# Patient Record
Sex: FEMALE | Race: WHITE
[De-identification: names, ages, dates, MRNs, and addresses within clinical notes are randomized per-mention and may not be internally consistent; named-entity substitution may affect disease eponyms.]

---

## 2018-01-26 ENCOUNTER — HOSPITAL ENCOUNTER (EMERGENCY)
Dept: HOSPITAL 47 - EC | Age: 33
Discharge: HOME | End: 2018-01-26
Payer: COMMERCIAL

## 2018-01-26 VITALS
HEART RATE: 74 BPM | SYSTOLIC BLOOD PRESSURE: 117 MMHG | RESPIRATION RATE: 18 BRPM | TEMPERATURE: 98.2 F | DIASTOLIC BLOOD PRESSURE: 76 MMHG

## 2018-01-26 DIAGNOSIS — Z87.891: ICD-10-CM

## 2018-01-26 DIAGNOSIS — Z88.5: ICD-10-CM

## 2018-01-26 DIAGNOSIS — Z86.14: ICD-10-CM

## 2018-01-26 DIAGNOSIS — K80.70: Primary | ICD-10-CM

## 2018-01-26 LAB
ALBUMIN SERPL-MCNC: 4.5 G/DL (ref 3.5–5)
ALP SERPL-CCNC: 91 U/L (ref 38–126)
ALT SERPL-CCNC: 31 U/L (ref 9–52)
AMYLASE SERPL-CCNC: 60 U/L (ref 30–110)
ANION GAP SERPL CALC-SCNC: 12 MMOL/L
AST SERPL-CCNC: 23 U/L (ref 14–36)
BASOPHILS # BLD AUTO: 0.1 K/UL (ref 0–0.2)
BASOPHILS NFR BLD AUTO: 1 %
BUN SERPL-SCNC: 11 MG/DL (ref 7–17)
CALCIUM SPEC-MCNC: 9.6 MG/DL (ref 8.4–10.2)
CHLORIDE SERPL-SCNC: 104 MMOL/L (ref 98–107)
CO2 SERPL-SCNC: 24 MMOL/L (ref 22–30)
EOSINOPHIL # BLD AUTO: 0.2 K/UL (ref 0–0.7)
EOSINOPHIL NFR BLD AUTO: 2 %
ERYTHROCYTE [DISTWIDTH] IN BLOOD BY AUTOMATED COUNT: 4.47 M/UL (ref 3.8–5.4)
ERYTHROCYTE [DISTWIDTH] IN BLOOD: 14.1 % (ref 11.5–15.5)
GLUCOSE SERPL-MCNC: 94 MG/DL (ref 74–99)
HCT VFR BLD AUTO: 36.3 % (ref 34–46)
HGB BLD-MCNC: 12.2 GM/DL (ref 11.4–16)
LIPASE SERPL-CCNC: 73 U/L (ref 23–300)
LYMPHOCYTES # SPEC AUTO: 3.1 K/UL (ref 1–4.8)
LYMPHOCYTES NFR SPEC AUTO: 28 %
MCH RBC QN AUTO: 27.3 PG (ref 25–35)
MCHC RBC AUTO-ENTMCNC: 33.6 G/DL (ref 31–37)
MCV RBC AUTO: 81.2 FL (ref 80–100)
MONOCYTES # BLD AUTO: 0.5 K/UL (ref 0–1)
MONOCYTES NFR BLD AUTO: 5 %
NEUTROPHILS # BLD AUTO: 6.8 K/UL (ref 1.3–7.7)
NEUTROPHILS NFR BLD AUTO: 63 %
PH UR: 5.5 [PH] (ref 5–8)
PLATELET # BLD AUTO: 308 K/UL (ref 150–450)
POTASSIUM SERPL-SCNC: 4.2 MMOL/L (ref 3.5–5.1)
PROT SERPL-MCNC: 7.6 G/DL (ref 6.3–8.2)
SODIUM SERPL-SCNC: 140 MMOL/L (ref 137–145)
SP GR UR: 1.01 (ref 1–1.03)
UROBILINOGEN UR QL STRIP: <2 MG/DL (ref ?–2)
WBC # BLD AUTO: 10.8 K/UL (ref 3.8–10.6)

## 2018-01-26 PROCEDURE — 36415 COLL VENOUS BLD VENIPUNCTURE: CPT

## 2018-01-26 PROCEDURE — 82150 ASSAY OF AMYLASE: CPT

## 2018-01-26 PROCEDURE — 80053 COMPREHEN METABOLIC PANEL: CPT

## 2018-01-26 PROCEDURE — 96360 HYDRATION IV INFUSION INIT: CPT

## 2018-01-26 PROCEDURE — 83690 ASSAY OF LIPASE: CPT

## 2018-01-26 PROCEDURE — 85025 COMPLETE CBC W/AUTO DIFF WBC: CPT

## 2018-01-26 PROCEDURE — 81003 URINALYSIS AUTO W/O SCOPE: CPT

## 2018-01-26 PROCEDURE — 81025 URINE PREGNANCY TEST: CPT

## 2018-01-26 PROCEDURE — 76705 ECHO EXAM OF ABDOMEN: CPT

## 2018-01-26 PROCEDURE — 99284 EMERGENCY DEPT VISIT MOD MDM: CPT

## 2018-01-26 NOTE — ED
General Adult HPI





<Jose Mora - Last Filed: 01/26/18 22:24>





- General


Source: patient, RN notes reviewed


Mode of arrival: ambulatory


Limitations: no limitations





<Carli Pacheco - Last Filed: 01/26/18 22:34>





- General


Chief complaint: Abdominal Pain


Stated complaint: Abd pain


Time Seen by Provider: 01/26/18 19:50





- History of Present Illness


Initial comments: 





32-year-old female presents to the emergency department with a chief complaint 

of epigastric abdominal pain radiates to the back.  She states that she's had 

this on and off for the past month or so.  Of flareup minimal go away.  She 

denies any nausea or vomiting with it.  She denies any relation to anything in 

particular with it.  She has not had any high fever chills.  She denies any 

history of abdominal surgeries in the past.  They were concerned due to her 

continued pain and the fact does not seem to be getting better so they thought 

that they should be evaluated. Patient denies any recent fever, chills, 

shortness of breath, chest pain, back pain, numbness or tingling, dysuria or 

hematuria, constipation or diarrhea, headaches or visual changes, or any other 

current symptoms. (Carli Pacheco)





- Related Data


 Home Medications











 Medication  Instructions  Recorded  Confirmed


 


Ibuprofen [Motrin Ib] 400 mg PO Q6H PRN 01/26/18 01/26/18











 Allergies











Allergy/AdvReac Type Severity Reaction Status Date / Time


 


propoxyphene napsylate AdvReac  Nausea & Verified 01/26/18 19:59





[From Darvocet-N]   Vomiting  














Review of Systems


ROS Other: All systems not noted in ROS Statement are negative.





<Jose Mora - Last Filed: 01/26/18 22:24>


ROS Other: All systems not noted in ROS Statement are negative.





<Carli Pacheco - Last Filed: 01/26/18 22:34>


ROS Statement: 


Those systems with pertinent positive or pertinent negative responses have been 

documented in the HPI.








Past Medical History


Past Medical History: No Reported History


History of Any Multi-Drug Resistant Organisms: MRSA


Date of last positivie culture/infection: 2008


MDRO Source:: Groin, L Breast


Past Surgical History: No Surgical Hx Reported


Past Anesthesia/Blood Transfusion Reactions: No Reported Reaction


Past Psychological History: Anxiety


Smoking Status: Former smoker


Past Alcohol Use History: Occasional


Past Drug Use History: Marijuana





- Past Family History


  ** Mother


Family Medical History: No Reported History





<Carli Pacheco - Last Filed: 01/26/18 22:34>





General Exam





<MorganJose - Last Filed: 01/26/18 22:24>


Limitations: no limitations





<Carli Pacheco - Last Filed: 01/26/18 22:34>





- General Exam Comments


Initial Comments: 





General:  The patient is awake and alert, in no distress, and does not appear 

acutely ill. 


Eye:  Pupils are equal, round and reactive to light, extra-ocular movements are 

intact; there is normal conjunctiva bilaterally.  No signs of icterus.  


Ears, nose, mouth and throat:  There are moist mucous membranes.


Neck:  The neck is supple, there is no tenderness.


Cardiovascular:  There is a regular rate and rhythm. No murmur, rub or gallop 

is appreciated.


Respiratory:  Lungs are clear to auscultation, respirations are non-labored, 

breath sounds are equal.  No wheezes, stridor, rales, or rhonchi.


Gastrointestinal:  Soft, non-distended, mild right upper quadrant tenderness of 

the abdomen without masses or organomegaly noted. There is no rebound or 

guarding present.  No CVA tenderness. Bowel sounds are unremarkable.


Back:  There is no tenderness to palpation in the midline. There is no obvious 

deformity. No rashes noted. 


Musculoskeletal:  Normal ROM, no tenderness, There is no pedal edema. There is 

no calf tenderness or swelling. Sensation intact. Pulses equal bilaterally 2+.  


Neurological:  CN II-XII intact, There are no obvious motor or sensory 

deficits. Coordination appears grossly intact. Speech is normal.


Skin:  Skin is warm and dry and no rashes or lesions are noted. 


Psychiatric:  Cooperative, appropriate mood & affect, normal judgment.  


 (Carli Pacheco)


 Vital Signs











  01/26/18





  19:35


 


Temperature 97.5 F L


 


Pulse Rate 85


 


Respiratory 16





Rate 


 


Blood Pressure 118/55


 


O2 Sat by Pulse 100





Oximetry 














Medical Decision Making





- Lab Data


Result diagrams: 


 01/26/18 20:34





 01/26/18 20:34





<Jose Mora - Last Filed: 01/26/18 22:24>





- Lab Data


Result diagrams: 


 01/26/18 20:34





 01/26/18 20:34





- Radiology Data


Radiology results: report reviewed, image reviewed





<Carli Pacheco - Last Filed: 01/26/18 22:34>





- Medical Decision Making





Medical decision making; is a 32-year-old female has been having worsening 

gallbladder issues.  Today she ate a greasy fatty food meal and developed acute 

discomfort.  Her stools lighter in color recently.  The ultrasound shows 

cholelithiasis but no evidence of obstruction at this time.  White count is 10.

  The patient's feeling significantly better at this time.  She'll be referred 

onto her family doctor and general surgeon for evaluation and she'll also be 

given a prescription for a HIDA scan to be done at her convenience early next 

week.  Dr. Mora (Jose Mora)





32-year-old female presents for abdominal pain.  At this time ultrasound is 

been reviewed.  This time we discussed follow-up with Dr. Duncan due to 

biliary colic as well as cholelithiasis.  This time pain has improved.  At this 

time we did discuss return parameters and follow-up.  We discussed what to 

watch for and when to come back to the ER.  The patient is this plan all 

questions have been answered.  Patient will be discharged. (Carli Pacheco)





- Lab Data


 Lab Results











  01/26/18 01/26/18 01/26/18 Range/Units





  20:34 20:34 21:35 


 


WBC   10.8 H   (3.8-10.6)  k/uL


 


RBC   4.47   (3.80-5.40)  m/uL


 


Hgb   12.2   (11.4-16.0)  gm/dL


 


Hct   36.3   (34.0-46.0)  %


 


MCV   81.2   (80.0-100.0)  fL


 


MCH   27.3   (25.0-35.0)  pg


 


MCHC   33.6   (31.0-37.0)  g/dL


 


RDW   14.1   (11.5-15.5)  %


 


Plt Count   308   (150-450)  k/uL


 


Neutrophils %   63   %


 


Lymphocytes %   28   %


 


Monocytes %   5   %


 


Eosinophils %   2   %


 


Basophils %   1   %


 


Neutrophils #   6.8   (1.3-7.7)  k/uL


 


Lymphocytes #   3.1   (1.0-4.8)  k/uL


 


Monocytes #   0.5   (0-1.0)  k/uL


 


Eosinophils #   0.2   (0-0.7)  k/uL


 


Basophils #   0.1   (0-0.2)  k/uL


 


Sodium  140    (137-145)  mmol/L


 


Potassium  4.2    (3.5-5.1)  mmol/L


 


Chloride  104    ()  mmol/L


 


Carbon Dioxide  24    (22-30)  mmol/L


 


Anion Gap  12    mmol/L


 


BUN  11    (7-17)  mg/dL


 


Creatinine  0.62    (0.52-1.04)  mg/dL


 


Est GFR (MDRD) Af Amer  >60    (>60 ml/min/1.73 sqM)  


 


Est GFR (MDRD) Non-Af  >60    (>60 ml/min/1.73 sqM)  


 


Glucose  94    (74-99)  mg/dL


 


Calcium  9.6    (8.4-10.2)  mg/dL


 


Total Bilirubin  0.7    (0.2-1.3)  mg/dL


 


AST  23    (14-36)  U/L


 


ALT  31    (9-52)  U/L


 


Alkaline Phosphatase  91    ()  U/L


 


Total Protein  7.6    (6.3-8.2)  g/dL


 


Albumin  4.5    (3.5-5.0)  g/dL


 


Amylase  60    ()  U/L


 


Lipase  73    ()  U/L


 


Urine Color     


 


Urine Appearance     (Clear)  


 


Urine pH     (5.0-8.0)  


 


Ur Specific Gravity     (1.001-1.035)  


 


Urine Protein     (Negative)  


 


Urine Glucose (UA)     (Negative)  


 


Urine Ketones     (Negative)  


 


Urine Blood     (Negative)  


 


Urine Nitrite     (Negative)  


 


Urine Bilirubin     (Negative)  


 


Urine Urobilinogen     (<2.0)  mg/dL


 


Ur Leukocyte Esterase     (Negative)  


 


Urine HCG, Qual    Not Detected  (Not Detectd)  














  01/26/18 Range/Units





  21:35 


 


WBC   (3.8-10.6)  k/uL


 


RBC   (3.80-5.40)  m/uL


 


Hgb   (11.4-16.0)  gm/dL


 


Hct   (34.0-46.0)  %


 


MCV   (80.0-100.0)  fL


 


MCH   (25.0-35.0)  pg


 


MCHC   (31.0-37.0)  g/dL


 


RDW   (11.5-15.5)  %


 


Plt Count   (150-450)  k/uL


 


Neutrophils %   %


 


Lymphocytes %   %


 


Monocytes %   %


 


Eosinophils %   %


 


Basophils %   %


 


Neutrophils #   (1.3-7.7)  k/uL


 


Lymphocytes #   (1.0-4.8)  k/uL


 


Monocytes #   (0-1.0)  k/uL


 


Eosinophils #   (0-0.7)  k/uL


 


Basophils #   (0-0.2)  k/uL


 


Sodium   (137-145)  mmol/L


 


Potassium   (3.5-5.1)  mmol/L


 


Chloride   ()  mmol/L


 


Carbon Dioxide   (22-30)  mmol/L


 


Anion Gap   mmol/L


 


BUN   (7-17)  mg/dL


 


Creatinine   (0.52-1.04)  mg/dL


 


Est GFR (MDRD) Af Amer   (>60 ml/min/1.73 sqM)  


 


Est GFR (MDRD) Non-Af   (>60 ml/min/1.73 sqM)  


 


Glucose   (74-99)  mg/dL


 


Calcium   (8.4-10.2)  mg/dL


 


Total Bilirubin   (0.2-1.3)  mg/dL


 


AST   (14-36)  U/L


 


ALT   (9-52)  U/L


 


Alkaline Phosphatase   ()  U/L


 


Total Protein   (6.3-8.2)  g/dL


 


Albumin   (3.5-5.0)  g/dL


 


Amylase   ()  U/L


 


Lipase   ()  U/L


 


Urine Color  Yellow  


 


Urine Appearance  Clear  (Clear)  


 


Urine pH  5.5  (5.0-8.0)  


 


Ur Specific Gravity  1.013  (1.001-1.035)  


 


Urine Protein  Negative  (Negative)  


 


Urine Glucose (UA)  Negative  (Negative)  


 


Urine Ketones  Negative  (Negative)  


 


Urine Blood  Negative  (Negative)  


 


Urine Nitrite  Negative  (Negative)  


 


Urine Bilirubin  Negative  (Negative)  


 


Urine Urobilinogen  <2.0  (<2.0)  mg/dL


 


Ur Leukocyte Esterase  Negative  (Negative)  


 


Urine HCG, Qual   (Not Detectd)  














Disposition





<Jose Mora - Last Filed: 01/26/18 22:24>


Time of Disposition: 22:34





<Carli Pacheco - Last Filed: 01/26/18 22:34>


Clinical Impression: 


 Biliary colic, Cholelithiasis





Disposition: HOME SELF-CARE


Condition: Stable


Instructions:  Biliary Colic (ED)


Additional Instructions: 


Please use medication as discussed. Please follow up with family doctor if 

symptoms have not improved over the next two days. Please return to the 

emergency room if your symptoms increase or worsen or for any other concerns. 


Referrals: 


Nadine Duncan MD [STAFF PHYSICIAN] - 1-2 days

## 2018-01-26 NOTE — US
EXAMINATION TYPE: US gallbladder

 

DATE OF EXAM: 1/26/2018

 

COMPARISON: NONE

 

CLINICAL HISTORY: Pain. Intermittent epigastric pain x 6 months

 

EXAM MEASUREMENTS:

Liver Length:  18.3 cm   

Gallbladder Wall:  0.3 cm   

CBD:  0.3 cm

Right Kidney:  10.1 x 4.8 x 5.1 cm

 

Pancreas:  visualized portions wnl, head and tail obscured by overlying midline bowel gas

Liver:  enlarged at 18.3cm, heterogenous, increased echogenicity  

Gallbladder:  appears hydropic with multiple mobile echogenic shadowing foci, wall measures in upper 
limits of normal at 0.3cm

**Evidence for sonographic Samayoa's sign:  yes

CBD:  visualized portions wnl, limited by overlying bowel gas 

Right Kidney:  wnl 

 

IMPRESSION: 

Distended Gallbladder with cholelithiasis occupying approximately 5% of its lumen. Without gallbladde
r wall thickening there is no definite cholecystitis sonographically, although it is noted that the s
onographer reported Samayoa's sign.

## 2018-04-27 ENCOUNTER — HOSPITAL ENCOUNTER (OUTPATIENT)
Dept: HOSPITAL 47 - OR | Age: 33
Discharge: HOME | End: 2018-04-27
Payer: COMMERCIAL

## 2018-04-27 VITALS — TEMPERATURE: 97.6 F

## 2018-04-27 VITALS — HEART RATE: 82 BPM | SYSTOLIC BLOOD PRESSURE: 119 MMHG | DIASTOLIC BLOOD PRESSURE: 78 MMHG

## 2018-04-27 VITALS — RESPIRATION RATE: 18 BRPM

## 2018-04-27 VITALS — BODY MASS INDEX: 32.9 KG/M2

## 2018-04-27 DIAGNOSIS — K80.10: Primary | ICD-10-CM

## 2018-04-27 DIAGNOSIS — Z88.8: ICD-10-CM

## 2018-04-27 DIAGNOSIS — Z79.899: ICD-10-CM

## 2018-04-27 DIAGNOSIS — Z87.891: ICD-10-CM

## 2018-04-27 PROCEDURE — 88304 TISSUE EXAM BY PATHOLOGIST: CPT

## 2018-04-27 PROCEDURE — 47562 LAPAROSCOPIC CHOLECYSTECTOMY: CPT

## 2018-04-27 PROCEDURE — 81025 URINE PREGNANCY TEST: CPT

## 2018-04-27 NOTE — P.OP
Date of Procedure: 04/27/18


Procedure(s) Performed: 


PREOPERATIVE DIAGNOSIS: Biliary dyskinesia


POSTOPERATIVE DIAGNOSIS: Same


PROCEDURE: Laparoscopic cholecystectomy


SURGEON: Charlie


EBL: Minimal see anesthesia record


ANESTHESIA: Gen.


COMPLICATIONS: None


OPERATIVE PROCEDURE: The patient was brought and placed on the operating room 

table in the supine position.  The patient was placed under general anesthesia 

at that time.  The abdomen was prepped and draped in the usual sterile fashion.

  A small vertical infraumbilical incision was made.  The fascia was grasped 

with the Kocher forceps.  The fascia was retracted anteriorly.  The Veress 

needle was advanced into the peritoneal cavity.  The saline drop test was 

normal.  Insufflation took place up to 15 mmHg.  A 5 mm optical trocar was 

advanced and the peritoneal cavity.  2 additional 5 mm trochars were placed in 

the right upper quadrant under direct visualization.  A 10 mm trocar was 

advanced into the epigastric incision site.  The gallbladder was retracted 

superiorly and laterally.  The peritoneum overlying the infundibulum was 

bluntly dissected.  The patient's cystic duct was visualized.  The junction 

between the cystic duct common and hepatic duct was identified.  The cystic 

duct was then divided after placement of 3 10 mm clips on the patient's side 

and one on the specimen side.  The cystic artery was identified and clipped as 

well.  A small vessel was seen along the gallbladder fossa and clipped as well.

  The gallbladder was then removed from the liver bed using electrocautery.  

The gallbladder was then removed from the epigastric trocar site with an Endo 

Catch bag.  The gallbladder fossa was irrigated with saline.  There was no 

evidence of any bleeding or biliary drainage seen.  The trochars were then 

removed.  The fascia at the 10 millimeter site was closed using a figure-of-

eight 0 Vicryl stitch.  The skin at all 4 sites was closed using a 4-0 Monocryl 

stitch.  At the end of this procedure the sponge and needle counts were correct.


DISPOSITION: Stable to the recovery room

## 2018-07-12 ENCOUNTER — HOSPITAL ENCOUNTER (OUTPATIENT)
Dept: HOSPITAL 47 - LABPAT | Age: 33
Discharge: HOME | End: 2018-07-12
Payer: COMMERCIAL

## 2018-07-12 DIAGNOSIS — O03.9: ICD-10-CM

## 2018-07-12 DIAGNOSIS — Z01.812: Primary | ICD-10-CM

## 2018-07-12 LAB
BASOPHILS # BLD AUTO: 0 K/UL (ref 0–0.2)
BASOPHILS NFR BLD AUTO: 0 %
EOSINOPHIL # BLD AUTO: 0.1 K/UL (ref 0–0.7)
EOSINOPHIL NFR BLD AUTO: 1 %
ERYTHROCYTE [DISTWIDTH] IN BLOOD BY AUTOMATED COUNT: 4.56 M/UL (ref 3.8–5.4)
ERYTHROCYTE [DISTWIDTH] IN BLOOD: 13.6 % (ref 11.5–15.5)
HCT VFR BLD AUTO: 39.6 % (ref 34–46)
HGB BLD-MCNC: 13 GM/DL (ref 11.4–16)
LYMPHOCYTES # SPEC AUTO: 1.7 K/UL (ref 1–4.8)
LYMPHOCYTES NFR SPEC AUTO: 16 %
MCH RBC QN AUTO: 28.4 PG (ref 25–35)
MCHC RBC AUTO-ENTMCNC: 32.8 G/DL (ref 31–37)
MCV RBC AUTO: 86.7 FL (ref 80–100)
MONOCYTES # BLD AUTO: 0.4 K/UL (ref 0–1)
MONOCYTES NFR BLD AUTO: 4 %
NEUTROPHILS # BLD AUTO: 8.1 K/UL (ref 1.3–7.7)
NEUTROPHILS NFR BLD AUTO: 78 %
PLATELET # BLD AUTO: 312 K/UL (ref 150–450)
WBC # BLD AUTO: 10.4 K/UL (ref 3.8–10.6)

## 2018-07-12 PROCEDURE — 85025 COMPLETE CBC W/AUTO DIFF WBC: CPT

## 2018-07-12 PROCEDURE — 36415 COLL VENOUS BLD VENIPUNCTURE: CPT

## 2018-07-13 ENCOUNTER — HOSPITAL ENCOUNTER (OUTPATIENT)
Dept: HOSPITAL 47 - OR | Age: 33
Discharge: HOME | End: 2018-07-13
Attending: OBSTETRICS & GYNECOLOGY
Payer: COMMERCIAL

## 2018-07-13 VITALS — SYSTOLIC BLOOD PRESSURE: 109 MMHG | DIASTOLIC BLOOD PRESSURE: 67 MMHG | HEART RATE: 77 BPM

## 2018-07-13 VITALS — BODY MASS INDEX: 32.9 KG/M2

## 2018-07-13 VITALS — RESPIRATION RATE: 16 BRPM | TEMPERATURE: 98 F

## 2018-07-13 DIAGNOSIS — O02.1: Primary | ICD-10-CM

## 2018-07-13 DIAGNOSIS — Z3A.08: ICD-10-CM

## 2018-07-13 DIAGNOSIS — J30.2: ICD-10-CM

## 2018-07-13 DIAGNOSIS — Z86.14: ICD-10-CM

## 2018-07-13 DIAGNOSIS — Z88.5: ICD-10-CM

## 2018-07-13 DIAGNOSIS — Z87.891: ICD-10-CM

## 2018-07-13 PROCEDURE — 86900 BLOOD TYPING SEROLOGIC ABO: CPT

## 2018-07-13 PROCEDURE — 86901 BLOOD TYPING SEROLOGIC RH(D): CPT

## 2018-07-13 PROCEDURE — 86850 RBC ANTIBODY SCREEN: CPT

## 2018-07-13 PROCEDURE — 88305 TISSUE EXAM BY PATHOLOGIST: CPT

## 2018-07-13 PROCEDURE — 59820 CARE OF MISCARRIAGE: CPT

## 2018-07-13 NOTE — P.OP
Date of Procedure: 18


Preoperative Diagnosis: 


Missed 


Postoperative Diagnosis: 


Same


Procedure(s) Performed: 


Suction dilation and curettage


Anesthesia: MAC


Surgeon: Joana Meneses


Estimated Blood Loss (ml): 25


IV fluids (ml): 400


Urine output (ml): 50


Pathology: other (Uterine contents)


Condition: stable


Disposition: PACU


Indications for Procedure: 


Missed  at approximately 8 weeks gestation


Operative Findings: 


Products of conception


Description of Procedure: 


After the patient was met in the preoperative holding area and all questions 

were answered, she was taken to the operating room where anesthetic was 

administered without incident.  She is in positioned, prepped and draped in the 

dorsal lithotomy position.  Bladder was drained for approximately 50 mL of 

clear urine.  Exam under anesthetic was performed and the uterus was 

approximately 8-10 weeks size and mobile.  She decided speculum was placed in 

the vagina and the cervix was grasped anteriorly with a single-tooth tenaculum.

  Uterus was sounded to 8 cm.  The cervix was approximately 1 cm dilated.  The 

cervix was further dilated with Hegar dilators to allow for passage of the 

curved 8 suction curette.  Suction curette was introduced on and the uterus was 

circumferentially curettaged with appropriate products of conception obtained.  

The suction curet was removed and an additional pass with the sharp curette was 

undertaken.  Minimal additional tissue was obtained.  Final pass with suction 

curet revealed no active bleeding or additional tissue.  The curet was removed 

and the cervix was observed with no active bleeding noted.  Tenaculum and 

speculum were removed and the patient was awoken from anesthetic.  She is 

transported recovery area in stable condition.  Of note patient's blood type is 

Rh+.

## 2020-09-09 ENCOUNTER — HOSPITAL ENCOUNTER (OUTPATIENT)
Dept: HOSPITAL 47 - LABWHC1 | Age: 35
Discharge: HOME | End: 2020-09-09
Attending: OBSTETRICS & GYNECOLOGY
Payer: COMMERCIAL

## 2020-09-09 DIAGNOSIS — N92.5: Primary | ICD-10-CM

## 2020-09-09 PROCEDURE — 36415 COLL VENOUS BLD VENIPUNCTURE: CPT

## 2020-09-09 PROCEDURE — 84702 CHORIONIC GONADOTROPIN TEST: CPT

## 2020-09-09 PROCEDURE — 84144 ASSAY OF PROGESTERONE: CPT

## 2020-09-11 ENCOUNTER — HOSPITAL ENCOUNTER (OUTPATIENT)
Dept: HOSPITAL 47 - LABWHC1 | Age: 35
Discharge: HOME | End: 2020-09-11
Attending: OBSTETRICS & GYNECOLOGY
Payer: COMMERCIAL

## 2020-09-11 DIAGNOSIS — N92.5: Primary | ICD-10-CM

## 2020-09-11 PROCEDURE — 36415 COLL VENOUS BLD VENIPUNCTURE: CPT

## 2020-09-11 PROCEDURE — 84702 CHORIONIC GONADOTROPIN TEST: CPT

## 2022-09-19 ENCOUNTER — HOSPITAL ENCOUNTER (OUTPATIENT)
Dept: HOSPITAL 47 - FBPOP | Age: 37
Discharge: HOME | End: 2022-09-19
Attending: OBSTETRICS & GYNECOLOGY
Payer: COMMERCIAL

## 2022-09-19 VITALS
RESPIRATION RATE: 16 BRPM | TEMPERATURE: 96.8 F | DIASTOLIC BLOOD PRESSURE: 58 MMHG | HEART RATE: 90 BPM | SYSTOLIC BLOOD PRESSURE: 117 MMHG

## 2022-09-19 DIAGNOSIS — Z3A.38: ICD-10-CM

## 2022-09-19 PROCEDURE — 84112 EVAL AMNIOTIC FLUID PROTEIN: CPT

## 2022-09-19 PROCEDURE — 59025 FETAL NON-STRESS TEST: CPT

## 2022-09-19 PROCEDURE — 99213 OFFICE O/P EST LOW 20 MIN: CPT

## 2022-09-29 ENCOUNTER — HOSPITAL ENCOUNTER (INPATIENT)
Dept: HOSPITAL 47 - 4FBP | Age: 37
LOS: 1 days | Discharge: HOME | End: 2022-09-30
Attending: OBSTETRICS & GYNECOLOGY | Admitting: OBSTETRICS & GYNECOLOGY
Payer: COMMERCIAL

## 2022-09-29 DIAGNOSIS — L30.9: ICD-10-CM

## 2022-09-29 DIAGNOSIS — Z28.310: ICD-10-CM

## 2022-09-29 DIAGNOSIS — O36.63X0: Primary | ICD-10-CM

## 2022-09-29 DIAGNOSIS — Z86.14: ICD-10-CM

## 2022-09-29 DIAGNOSIS — Z87.891: ICD-10-CM

## 2022-09-29 DIAGNOSIS — F41.9: ICD-10-CM

## 2022-09-29 DIAGNOSIS — Z3A.39: ICD-10-CM

## 2022-09-29 LAB
BASOPHILS # BLD AUTO: 0 K/UL (ref 0–0.2)
BASOPHILS NFR BLD AUTO: 0 %
EOSINOPHIL # BLD AUTO: 0.1 K/UL (ref 0–0.7)
EOSINOPHIL NFR BLD AUTO: 1 %
ERYTHROCYTE [DISTWIDTH] IN BLOOD BY AUTOMATED COUNT: 4.25 M/UL (ref 3.8–5.4)
ERYTHROCYTE [DISTWIDTH] IN BLOOD: 13.8 % (ref 11.5–15.5)
HCT VFR BLD AUTO: 36.4 % (ref 34–46)
HGB BLD-MCNC: 12.2 GM/DL (ref 11.4–16)
LYMPHOCYTES # SPEC AUTO: 1.8 K/UL (ref 1–4.8)
LYMPHOCYTES NFR SPEC AUTO: 17 %
MCH RBC QN AUTO: 28.7 PG (ref 25–35)
MCHC RBC AUTO-ENTMCNC: 33.5 G/DL (ref 31–37)
MCV RBC AUTO: 85.6 FL (ref 80–100)
MONOCYTES # BLD AUTO: 0.4 K/UL (ref 0–1)
MONOCYTES NFR BLD AUTO: 4 %
NEUTROPHILS # BLD AUTO: 7.9 K/UL (ref 1.3–7.7)
NEUTROPHILS NFR BLD AUTO: 76 %
PLATELET # BLD AUTO: 228 K/UL (ref 150–450)
WBC # BLD AUTO: 10.3 K/UL (ref 3.8–10.6)

## 2022-09-29 PROCEDURE — 86850 RBC ANTIBODY SCREEN: CPT

## 2022-09-29 PROCEDURE — 86900 BLOOD TYPING SEROLOGIC ABO: CPT

## 2022-09-29 PROCEDURE — 0HQ9XZZ REPAIR PERINEUM SKIN, EXTERNAL APPROACH: ICD-10-PCS

## 2022-09-29 PROCEDURE — 3E033VJ INTRODUCTION OF OTHER HORMONE INTO PERIPHERAL VEIN, PERCUTANEOUS APPROACH: ICD-10-PCS

## 2022-09-29 PROCEDURE — 10907ZC DRAINAGE OF AMNIOTIC FLUID, THERAPEUTIC FROM PRODUCTS OF CONCEPTION, VIA NATURAL OR ARTIFICIAL OPENING: ICD-10-PCS

## 2022-09-29 PROCEDURE — 85025 COMPLETE CBC W/AUTO DIFF WBC: CPT

## 2022-09-29 PROCEDURE — 86901 BLOOD TYPING SEROLOGIC RH(D): CPT

## 2022-09-29 RX ADMIN — IBUPROFEN PRN MG: 600 TABLET ORAL at 23:44

## 2022-09-29 RX ADMIN — POTASSIUM CHLORIDE SCH: 14.9 INJECTION, SOLUTION INTRAVENOUS at 18:46

## 2022-09-29 RX ADMIN — ACETAMINOPHEN PRN MG: 325 TABLET, FILM COATED ORAL at 20:20

## 2022-09-29 RX ADMIN — DOCUSATE SODIUM AND SENNOSIDES SCH EACH: 50; 8.6 TABLET ORAL at 20:20

## 2022-09-29 RX ADMIN — IBUPROFEN PRN MG: 600 TABLET ORAL at 17:25

## 2022-09-29 RX ADMIN — IBUPROFEN PRN MG: 600 TABLET ORAL at 10:28

## 2022-09-29 RX ADMIN — POTASSIUM CHLORIDE SCH MLS/HR: 14.9 INJECTION, SOLUTION INTRAVENOUS at 06:25

## 2022-09-29 NOTE — P.HPOB
History of Present Illness


H&P Date: 22


Chief Complaint: Term pregnancy





This is a 36-year-old  8 para 1243 woman who is admitted at 39-6/7 weeks'

gestation for induction of labor with a favorable cervix.  Her pregnancy has 

been complicated by covert in the early portion of the pregnancy.  She has been 

monitored with  testing which will has been reassuring.  She has a 

large for gestational age infant with estimated fetal weight at the 87th 

percentile by ultrasound in the third trimester.





Her obstetric history is significant for  deliveries at 35 weeks, a 36 

weeks and a term delivery at 39 weeks of the baby with pulmonary stenosis.  

Fetal echo and pediatric cardiology consultation on this pregnancy have all been

reassuring.





Laboratory data blood type A+, antibody screen negative, rubella immune, VDRL 

nonreactive, hepatitis surface antigen negative, HIV negative, gonorrhea and cli

kati cultures negative, he glucose tolerance testing within normal limits, group 

B strep negative.





Review of Systems


All systems: negative





Past Medical History


Past Medical History: Skin Disorder


Additional Past Medical History / Comment(s): eczema, anemia


History of Any Multi-Drug Resistant Organisms: MRSA


Date of last positivie culture/infection: 


MDRO Source:: Groin


Past Surgical History: Cholecystectomy


Additional Past Surgical History / Comment(s): Coila Teeth


Past Anesthesia/Blood Transfusion Reactions: Motion Sickness


Past Psychological History: Anxiety


Additional Psychological History / Comment(s): does not take medication for 

anxiety


Smoking Status: Former smoker


Past Alcohol Use History: None Reported


Additional Past Alcohol Use History / Comment(s): quit smoking , smoked for 

6 yrs, 1 PPD


Past Drug Use History: None Reported





- Past Family History


  ** Mother


Family Medical History: No Reported History





Medications and Allergies


                                Home Medications











 Medication  Instructions  Recorded  Confirmed  Type


 


Famotidine [Zantac-360 10 mg PO AS DIRECTED 22 History





(Famotidine)]    


 


Prenatal Vit No.179/Iron/Folic 1 each PO DAILY 22 History





[Prenatal Tablet]    








                                    Allergies











Allergy/AdvReac Type Severity Reaction Status Date / Time


 


propoxyphene napsylate AdvReac  Nausea & Verified 22 06:19





[From Darvocet-N]   Vomiting  














Exam


                                   Vital Signs











  Temp Pulse Resp BP Pulse Ox


 


 22 06:18  96.9 F L  93  17  113/77  97








                                Intake and Output











 22





 22:59 06:59 14:59


 


Other:   


 


  Weight  102.965 kg 














Targeted physical exam is performed.  This is a pleasant visibly gravid woman in

no obvious distress.  The abdomen is gravid with a fundal height larger than 

gestational age.  On pelvic examination the cervix is 4+ centimeters dilated, 

80% effaced and the vertex in the -3 station.  Artificial rupture membranes 

undertaken and clear fluid is noted.  Fetal heart tones are category 1 by 

external fetal monitoring and she is matthew very irregularly.





Results


Result Diagrams: 


                                 22 06:30





                  Abnormal Lab Results - Last 24 Hours (Table)











  22 Range/Units





  06:30 


 


Neutrophils #  7.9 H  (1.3-7.7)  k/uL














Assessment and Plan


(1) Term pregnancy


Current Visit: Yes   Status: Acute   Code(s): Z34.90 - ENCNTR FOR SUPRVSN OF 

NORMAL PREGNANCY, UNSP, UNSP TRIMESTER   SNOMED Code(s): 91080460


   





(2) LGA (large for gestational age) fetus


Current Visit: Yes   Status: Acute   Code(s): FJJ3856 -    SNOMED Code(s): 

048025314


   


Plan: 





36-year-old  8 para 1-43 woman who is admitted at 39-6/7 weeks' gestation

for induction of labor with a favorable cervix.  Fetal status is currently 

reassuring by external fetal monitoring.  She is group B strep negative and Rh+.

 Large for gestational age infant is suspected based on examination as well as 

ultrasound.  Nursing staff is aware.

## 2022-09-29 NOTE — P.PROBDLV
Vaginal Delivery Note





- .


Vaginal Delivery Note: 





Findings: Male infant in the left occiput anterior position with Apgars of 9 at 

1 minute and 9 at 5 minutes weighing 8 lbs. 7 oz., 3830 g.  Intact, three-vessel

cord placenta.  First-degree perineal laceration.   mL's.





Delivery summary: This is a 36-year-old  8 para 1243 woman who was 

admitted at 39-6/7 weeks' gestation for induction of labor with a favorable 

cervix.  She had a suspected large for gestational age infant based on 

examination as well as ultrasound indicating 7th percentile for growth.  At the 

time of admission she underwent artificial rupture of membranes and was 

approximate 4 cm dilated at 8 AM.  She had rapid progression to complete 

cervical dilation and had a very strong urge to push.  She had approximately 90 

minute first stage of labor.  With uncontrolled maternal effort the head did 

crown.  Additional staff was available in the room and the patient was in the 

modified [place] position.  With additional maternal effort the head did 

deliver from the direct occiput anterior position and there was an immediate 

"turtle sign".  The patient was placed in the [place] position however had 

difficulty with complete flexion of the hips secondary to maternal discomfort.  

The head rested tooted to the left occiput anterior position.  The head was 

extremely tight against the posterior perineum precluding the immediate 

episiotomy or excess to the posterior shoulder.  The anterior shoulder was then 

attempted to be internally rotated.  This did not facilitate delivery.  

Suprapubic pressure was then administered in addition to internal rotation of 

the anterior shoulder.  This allowed access to the humerus which I was then able

to grasp with my index and middle finger.  With additional internal rotation and

gentle downward traction this facilitated delivery of the sohulder and then the 

infant.  Estimated time of dystocia was between 30 and 45 seconds.  Following 

delivery the nose and mouth were bulb suctioned and the cord was clamped and 

cut.  The infant was taken immediately to the warmer for assessment.  Apgars w

ere 9 at 1 minute and 9 at 5 minutes and weight was 8 lbs. 7 oz., 3830 g.  After

an approximately 3 minute third stage of labor an intact, three-vessel cord 

placenta was delivered.  The perineum was inspected and a first-degree 

laceration was noted.  This was infused with lidocaine and repaired with a 

figure-of-eight of 3-0 Vicryl suture.  The patient received Pitocin following 

the third stage of labor and EBL was approximately 250 mL's.





Events of delivery were reviewed with Shira and her .  The Shoulder 

dystocia and the possibility of a left arm injury were reviewed with them in lig

ht of the extremely difficult delivery.  The pediatrician has been notified and 

will evaluate the infant.  The infant does appear to have decreased movement in 

the left upper extremity compared to the right at this time.  There is some 

facial bruising noted as well.

## 2022-09-30 VITALS
HEART RATE: 78 BPM | DIASTOLIC BLOOD PRESSURE: 74 MMHG | TEMPERATURE: 97.1 F | RESPIRATION RATE: 17 BRPM | SYSTOLIC BLOOD PRESSURE: 110 MMHG

## 2022-09-30 LAB
BASOPHILS # BLD AUTO: 0 K/UL (ref 0–0.2)
BASOPHILS NFR BLD AUTO: 0 %
EOSINOPHIL # BLD AUTO: 0.2 K/UL (ref 0–0.7)
EOSINOPHIL NFR BLD AUTO: 2 %
ERYTHROCYTE [DISTWIDTH] IN BLOOD BY AUTOMATED COUNT: 3.89 M/UL (ref 3.8–5.4)
ERYTHROCYTE [DISTWIDTH] IN BLOOD: 14.1 % (ref 11.5–15.5)
HCT VFR BLD AUTO: 33.7 % (ref 34–46)
HGB BLD-MCNC: 11 GM/DL (ref 11.4–16)
LYMPHOCYTES # SPEC AUTO: 1.8 K/UL (ref 1–4.8)
LYMPHOCYTES NFR SPEC AUTO: 17 %
MCH RBC QN AUTO: 28.3 PG (ref 25–35)
MCHC RBC AUTO-ENTMCNC: 32.7 G/DL (ref 31–37)
MCV RBC AUTO: 86.6 FL (ref 80–100)
MONOCYTES # BLD AUTO: 0.4 K/UL (ref 0–1)
MONOCYTES NFR BLD AUTO: 4 %
NEUTROPHILS # BLD AUTO: 8 K/UL (ref 1.3–7.7)
NEUTROPHILS NFR BLD AUTO: 76 %
PLATELET # BLD AUTO: 205 K/UL (ref 150–450)
WBC # BLD AUTO: 10.4 K/UL (ref 3.8–10.6)

## 2022-09-30 RX ADMIN — DOCUSATE SODIUM AND SENNOSIDES SCH EACH: 50; 8.6 TABLET ORAL at 07:56

## 2022-09-30 RX ADMIN — IBUPROFEN PRN MG: 600 TABLET ORAL at 14:10

## 2022-09-30 RX ADMIN — ACETAMINOPHEN PRN MG: 325 TABLET, FILM COATED ORAL at 12:31

## 2022-09-30 RX ADMIN — IBUPROFEN PRN MG: 600 TABLET ORAL at 07:55

## 2022-09-30 RX ADMIN — ACETAMINOPHEN PRN MG: 325 TABLET, FILM COATED ORAL at 03:16

## 2022-09-30 NOTE — P.DS
Providers


Date of admission: 


22 06:06





Expected date of discharge: 22


Attending physician: 


Joana Meneses





Primary care physician: 


Stated None








- Discharge Diagnosis(es)


(1) Term pregnancy


Current Visit: Yes   Status: Acute   





(2) LGA (large for gestational age) fetus


Current Visit: Yes   Status: Acute   





(3) Shoulder dystocia, delivered


Current Visit: Yes   Status: Acute   





(4) Normal spontaneous vaginal delivery


Current Visit: No   Status: Acute   





(5) Perineal laceration with delivery, first degree


Current Visit: Yes   Status: Acute   


Hospital Course: 





This is a 36-year-old  8 now para 2-44 woman who was admitted at 39-6/7 

weeks' gestation for elective induction of labor with a favorable cervix.  She 

had a suspected large for gestational age infant based on examination and 

ultrasound.  She was admitted and underwent a Pitocin induction of labor per 

protocol with artificial rupture of membranes.  She had a very rapid 

approximately 90 minute first stage of labor to reach complete cervical 

dilation.  She had reassuring fetal status throughout.  With strong urge to push

she did push several times to .  Possibility of shoulder dystocia was 

prepared for and additional staff was in the room.  She did indeed have a severe

shoulder dystocia which was resolved with Tacos positioning, suprapubic 

pressure and internal rotation and delivery of the anterior, left, arm.  Please 

see the delivery summary for details.  The patient's postpartum course was 

otherwise unremarkable.  She had a first-degree perineal laceration that was 

repaired.  She had minimal lochia and was breast and bottle feeding 

successfully.  The infant was evaluated and radiographs were negative for 

fracture however there is limited mobility of the left shoulder.  No evidence of

neurovascular injury per the pediatrician.  Baby will follow-up with primary 

pediatrician.





Her postpartum labs were normal and vital signs were stable on postpartum day 

#1.  Her lochia was minimal.  Her perineum was well healing and she was 

ambulating and voiding without difficulty.  She was therefore discharged home 

with routine instructions for care and follow-up as well as close follow-up for 

the infant.


Procedures: 





Normal spontaneous vaginal delivery


Patient Condition at Discharge: Good





Plan - Discharge Summary


New Discharge Prescriptions: 


No Action


   Famotidine [Zantac-360 (Famotidine)] 10 mg PO AS DIRECTED


   Prenatal Vit No.179/Iron/Folic [Prenatal Tablet] 1 each PO DAILY


Discharge Medication List





Famotidine [Zantac-360 (Famotidine)] 10 mg PO AS DIRECTED 22 [History]


Prenatal Vit No.179/Iron/Folic [Prenatal Tablet] 1 each PO DAILY 22 

[History]








Follow up Appointment(s)/Referral(s): 


Joana Meneses MD [STAFF PHYSICIAN] - 6 Weeks


Activity/Diet/Wound Care/Special Instructions: 


Follow-up in the office in 6 weeks postpartum.  Call with any concerning signs 

or symptoms including heavy vaginal bleeding, severe abdominal pain, fever 

greater than 101, swelling or redness of the lower extremities, foul vaginal 

discharge, or signs of postpartum depression.  Nothing in the vagina for 6 weeks

after delivery, specifically no intercourse.


Discharge Disposition: HOME SELF-CARE

## 2022-10-18 NOTE — P.MSEPDOC
Presenting Problems





- Arrival Data


Date of Arrival on Unit: 22


Time of Arrival on Unit: 17:38


Mode of Transport: Ambulatory





- Complaint


OB-Reason for Admission/Chief Complaint: Other


Comment: pt here with c/o irregular contractions and possible leaking of fluid 

since.  1100 today, abd soft and non tender, reports + fm, denies complications 

with pregnancy





Prenatal Medical History





- Pregnancy Information


: 4


Para: 3


Term: 1


: 2


Number of Living Children: 3





- Gestational Age


Gestational Age by JACQUELINE (wks/days): 38 Weeks and 3 Days





Review of Systems





- Review of Systems


Constitutional: No problems


Breast: No problems


ENT: No problems


Cardiovascular: No problems


Respiratory: No problems


Gastrointestinal: No problems


Genitourinary: No problems


Musculoskeletal: No problems


Neurological: No problems


Skin: No problems





Vital Signs





- Temperature


Temperature: 96.8 F


Temperature Source: Temporal Artery Scan





- Pulse


  ** Right Brachial


Pulse Rate: 90


Pulse Assessment Method: Auscultation





- Respirations


Respiratory Rate: 16


Oxygen Delivery Method: Room Air


O2 Sat by Pulse Oximetry: 98





- Blood Pressure


  ** Right Arm


Blood Pressure: 117/58


Blood Pressure Mean: 77


Blood Pressure Source: Automatic Cuff





Medical Screen Scoring





- Cervical Exam


Dilation (cm): 2.5


Effacement (%): 70


Station: -2


Membranes: Intact





- Fetal Assessment - Baby A


Baseline FHR: 155


Fetal Heart Rate - NICHD Category: Category I (Normal)


NST: Reactive





Physician Notification





- Physician Notified


Physician Notified Date: 22


Physician Notified Time: 18:30


Physician: Carrie Marcelo Order Received: Yes (dc home)





Maternal Fetal Triage Index





- Non-Urgent/Priority 4


Non-Urgent Priority 4: Yes


Criteria Met for Priority 4: amnisure negative, pt ok to dc home





Disposition





- Disposition


OB Disposition: Physician follow up in office, Discharge to home, Written follow

up instructions reviewed


Discharge Date: 22


Discharge Time: 18:40


I agree with the RN Medical Screening Exam: Yes


Case reviewed; plan agreed upon as documented in EMR&OBIX.: Yes


Diagnosis: FALSE LABOR AT OR AFTER 37 COMPLETED WEEKS OF GESTATION